# Patient Record
Sex: FEMALE | Race: WHITE | NOT HISPANIC OR LATINO
[De-identification: names, ages, dates, MRNs, and addresses within clinical notes are randomized per-mention and may not be internally consistent; named-entity substitution may affect disease eponyms.]

---
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
## 2019-02-28 ENCOUNTER — RX RENEWAL (OUTPATIENT)
Age: 19
End: 2019-02-28

## 2019-02-28 RX ORDER — DOCUSATE SODIUM 100 MG/1
100 CAPSULE, LIQUID FILLED ORAL TWICE DAILY
Qty: 60 | Refills: 3 | Status: ACTIVE | COMMUNITY
Start: 2018-12-20 | End: 1900-01-01

## 2019-03-29 ENCOUNTER — RX RENEWAL (OUTPATIENT)
Age: 19
End: 2019-03-29

## 2019-12-31 ENCOUNTER — APPOINTMENT (OUTPATIENT)
Dept: PEDIATRIC ADOLESCENT MEDICINE | Facility: CLINIC | Age: 19
End: 2019-12-31
Payer: MEDICAID

## 2019-12-31 VITALS
HEART RATE: 94 BPM | WEIGHT: 142.5 LBS | DIASTOLIC BLOOD PRESSURE: 66 MMHG | SYSTOLIC BLOOD PRESSURE: 105 MMHG | BODY MASS INDEX: 25.57 KG/M2 | HEIGHT: 62.6 IN

## 2019-12-31 DIAGNOSIS — Z00.00 ENCOUNTER FOR GENERAL ADULT MEDICAL EXAMINATION W/OUT ABNORMAL FINDINGS: ICD-10-CM

## 2019-12-31 DIAGNOSIS — Z30.41 ENCOUNTER FOR SURVEILLANCE OF CONTRACEPTIVE PILLS: ICD-10-CM

## 2019-12-31 DIAGNOSIS — Z23 ENCOUNTER FOR IMMUNIZATION: ICD-10-CM

## 2019-12-31 DIAGNOSIS — Z30.45 ENCOUNTER FOR SURVEILLANCE OF TRANSDERMAL PATCH HORMONAL CONTRACEPTIVE DEVICE: ICD-10-CM

## 2019-12-31 PROCEDURE — ZZZZZ: CPT

## 2019-12-31 RX ORDER — NORELGESTROMIN AND ETHINYL ESTRADIOL 150; 35 UG/D; UG/D
150-35 PATCH TRANSDERMAL
Qty: 1 | Refills: 0 | Status: DISCONTINUED | COMMUNITY
Start: 2018-12-20 | End: 2019-12-31

## 2019-12-31 RX ORDER — NORGESTIMATE AND ETHINYL ESTRADIOL 0.25-0.035
0.25-35 KIT ORAL
Qty: 1 | Refills: 1 | Status: ACTIVE | COMMUNITY
Start: 2019-12-31 | End: 1900-01-01

## 2019-12-31 NOTE — REVIEW OF SYSTEMS
[Change in Activity] : no change in activity [Fever] : no fever [Eye Discharge] : no eye discharge [Wgt Loss (___ Lbs)] : no recent weight loss [Swollen Eyelids] : no swollen eyelids [Redness] : no redness [Change in Vision] : no change in vision  [Earache] : no earache [Sore Throat] : no sore throat [Nasal Stuffiness] : no nasal congestion [Edema] : no edema [Cyanosis] : no cyanosis [Nosebleeds] : no epistaxis [Diaphoresis] : not diaphoretic [Exercise Intolerance] : no persistence of exercise intolerance [Chest Pain] : no chest pain or discomfort [Palpitations] : no palpitations [Tachypnea] : not tachypneic [Wheezing] : no wheezing [Shortness of Breath] : no shortness of breath [Cough] : no cough [Vomiting] : no vomiting [Diarrhea] : no diarrhea [Change in Appetite] : no change in appetite [Abdominal Pain] : no abdominal pain [Constipation] : no constipation [Fainting (Syncope)] : no fainting [Seizure] : no seizures [Headache] : no headache [Dizziness] : no dizziness [Limping] : no limping [Back Pain] : ~T no back pain [Joint Swelling] : no joint swelling [Joint Pains] : no arthralgias [Rash] : no rash [Muscle Aches] : no muscle aches [Insect Bites] : no insect bites [Swollen Glands] : no lymphadenopathy [Bruising] : no tendency for easy bruising [Skin Lesions] : no skin lesions [Hyperactive] : no hyperactive behavior [Emotional Problems] : no ~T emotional problems [Sleep Disturbances] : ~T no sleep disturbances [Urinary Frequency] : no change in urinary frequency [Change In Personality] : ~T no personality change [Dec Urine Output] : no oliguria [Pubertal Concerns] : no pubertal concerns [Pain During Urination (Dysuria)] : no dysuria [Vaginal Discharge] : no vaginal discharge [Delayed Menarche] : no delayed menarche [Irregular Periods] : no irregular periods

## 2019-12-31 NOTE — PHYSICAL EXAM
[General Appearance - Well Developed] : interactive [General Appearance - Well-Appearing] : well appearing [Appearance Of Head] : the head was normocephalic [General Appearance - In No Acute Distress] : in no acute distress [Sclera] : the sclera were normal [Examination Of The Oral Cavity] : the teeth, gums, and palate were normal [Both Tympanic Membranes Were Examined] : both tympanic membranes were normal [Nasal Cavity] : the nasal mucosa and septum were normal [Outer Ear] : the ears and nose were normal in appearance [Oropharynx] : the oropharynx was normal  [Neck Cervical Mass (___cm)] : no neck mass was observed [Auscultation Breath Sounds / Voice Sounds] : clear bilateral breath sounds [Respiration, Rhythm And Depth] : normal respiratory rhythm and effort [Murmurs] : no murmurs [Heart Rate And Rhythm] : heart rate and rhythm were normal [Heart Sounds] : normal S1 and S2 [Abdomen Tenderness] : non-tender [Abdomen Soft] : soft [Bowel Sounds] : normal bowel sounds [Abdominal Distention] : nondistended [Motor Tone] : muscle strength and tone were normal [Musculoskeletal Exam: Normal Movement Of All Extremities] : normal movements of all extremities [No Visual Abnormalities] : no visible abnormailities [Deep Tendon Reflexes (DTR)] : deep tendon reflexes were 2+ and symmetric [Generalized Lymph Node Enlargement] : no lymphadenopathy [] : no significant rash [Skin Color & Pigmentation] : normal skin color and pigmentation [Skin Lesions] : no skin lesions [Initial Inspection: Infant Active And Alert] : active and alert [Alert] : alert [No Acute Distress] : no acute distress [Normocephalic] : normocephalic [EOMI Bilateral] : EOMI bilateral [Clear tympanic membranes with bony landmarks and light reflex present bilaterally] : clear tympanic membranes with bony landmarks and light reflex present bilaterally  [Pink Nasal Mucosa] : pink nasal mucosa [No Palpable Masses] : no palpable masses [Supple, full passive range of motion] : supple, full passive range of motion [Nonerythematous Oropharynx] : nonerythematous oropharynx [Clear to Ausculatation Bilaterally] : clear to auscultation bilaterally [Regular Rate and Rhythm] : regular rate and rhythm [Normal S1, S2 audible] : normal S1, S2 audible [No Murmurs] : no murmurs [+2 Femoral Pulses] : +2 femoral pulses [NonTender] : non tender [Soft] : soft [Normoactive Bowel Sounds] : normoactive bowel sounds [Non Distended] : non distended [No Hepatomegaly] : no hepatomegaly [No Splenomegaly] : no splenomegaly [No Abnormal Lymph Nodes Palpated] : no abnormal lymph nodes palpated [No pain or deformities with palpation of bone, muscles, joints] : no pain or deformities with palpation of bone, muscles, joints [No Gait Asymmetry] : no gait asymmetry [Normal Muscle Tone] : normal muscle tone [+2 Patella DTR] : +2 patella DTR [Straight] : straight [No Rash or Lesions] : no rash or lesions [Cranial Nerves Grossly Intact] : cranial nerves grossly intact

## 2019-12-31 NOTE — DISCUSSION/SUMMARY
[Physical Growth and Development] : physical growth and development [Social and Academic Competence] : social and academic competence [Emotional Well-Being] : emotional well-being [Risk Reduction] : risk reduction [Violence and Injury Prevention] : violence and injury prevention [FreeTextEntry1] : Ashley is a 18 year old female here for PPD plant, annual PE and immunization update. Since last PE 2 years ago, Ashley has had elective termination of pregnancy, started on BC patch and has been well. \par POCT urine pregnancy negative today.\par \par Plan:\par - Vaccine today: Hep A#1 today and PPD. 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

## 2019-12-31 NOTE — DEVELOPMENTAL MILESTONES
[Eats meals with family] : eats meals with family [NL] : normal [Eats regular meals including adequate fruits and vegetables] : eats regular meals including adequate fruits and vegetables [Has friends] : has friends [Home is free of violence] : home is free of violence [Uses safety belts/safety equipment] : uses safety belts/safety equipment [Has peer relationships free of violence] : has peer relationships free of violence [Sexually Active] : The patient is sexually active [Sometimes] : sometimes [Has ways to cope with stress] : has ways to cope with stress [Drinks non-sweetened liquids] : drinks no non-sweetened liquids [At least 1 hour of physical acitvity/day] : less than 1 hour of physical activity/day [Uses tobacco/alcohol/drugs] : does not use tobacco/alcohol/drugs [Impaired/Distracted driving] : no impaired/distracted driving [Has problems with sleep] : has no problems with sleep [Gets depressed, anxious, or irritable / has mood swings] : does not get depressed, anxious, or irritable / has no mood swings [Has thoughts about hurting self or considered suicide] : has no thoughts about hurting self or considered suicide [FreeTextEntry5] : lives with godmother (closer to college) [place] [FreeTextEntry6] : [place], sophomore  [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

## 2019-12-31 NOTE — HISTORY OF PRESENT ILLNESS
[Poor Dental Hygiene] : Poor [Needs Immunization] : Needs immunizations [Diverse, Healthy Diet] : her current diet is diverse and healthy [None] : The patient does not use dietary supplements [Acute Illness] : no illness since last visit [Adverse Reaction] : the patient has not had any significant adverse reactions to immunizations [de-identified] : has an appointment for impacted wisdom teeth extraction [FreeTextEntry1] : Ashley is a 18 year old female here for PPD plant, annual PE and immunization update. \par \par Since last PE, 2 years ago, ED visit for fever and infection after elective  however no hospitalizations. \par \par Currently on BC patch followed by GYN, Dr. Russo. Tested for STI at that time. Declined for testing today\par LMP:  2 weeks ago, lasting 4 days, still have cramps relieved with ibuprofen\par Denies history of migraines with auras\par \par Total lifetime partners:  1\par Current partners: 0\par Last SA:  last year\par Denies STI history\par Denies any painful urination, vaginal discharge/odor or lesions/mass\par Denies family hx of blood clots, stroke or migraines with auras

## 2019-12-31 NOTE — REVIEW OF SYSTEMS
[Change in Activity] : no change in activity [Fever] : no fever [Eye Discharge] : no eye discharge [Wgt Loss (___ Lbs)] : no recent weight loss [Swollen Eyelids] : no swollen eyelids [Nasal Stuffiness] : no nasal congestion [Redness] : no redness [Change in Vision] : no change in vision  [Sore Throat] : no sore throat [Nosebleeds] : no epistaxis [Earache] : no earache [Diaphoresis] : not diaphoretic [Edema] : no edema [Cyanosis] : no cyanosis [Palpitations] : no palpitations [Exercise Intolerance] : no persistence of exercise intolerance [Chest Pain] : no chest pain or discomfort [Cough] : no cough [Tachypnea] : not tachypneic [Wheezing] : no wheezing [Shortness of Breath] : no shortness of breath [Change in Appetite] : no change in appetite [Vomiting] : no vomiting [Constipation] : no constipation [Diarrhea] : no diarrhea [Abdominal Pain] : no abdominal pain [Seizure] : no seizures [Headache] : no headache [Fainting (Syncope)] : no fainting [Joint Pains] : no arthralgias [Dizziness] : no dizziness [Limping] : no limping [Muscle Aches] : no muscle aches [Joint Swelling] : no joint swelling [Back Pain] : ~T no back pain [Rash] : no rash [Skin Lesions] : no skin lesions [Insect Bites] : no insect bites [Swollen Glands] : no lymphadenopathy [Sleep Disturbances] : ~T no sleep disturbances [Bruising] : no tendency for easy bruising [Hyperactive] : no hyperactive behavior [Emotional Problems] : no ~T emotional problems [Dec Urine Output] : no oliguria [Change In Personality] : ~T no personality change [Vaginal Discharge] : no vaginal discharge [Urinary Frequency] : no change in urinary frequency [Pain During Urination (Dysuria)] : no dysuria [Irregular Periods] : no irregular periods [Pubertal Concerns] : no pubertal concerns [Delayed Menarche] : no delayed menarche [Negative] : Genitourinary

## 2019-12-31 NOTE — DEVELOPMENTAL MILESTONES
[Eats meals with family] : eats meals with family [Has friends] : has friends [NL] : normal [Eats regular meals including adequate fruits and vegetables] : eats regular meals including adequate fruits and vegetables [Home is free of violence] : home is free of violence [Uses safety belts/safety equipment] : uses safety belts/safety equipment [Sometimes] : sometimes [Sexually Active] : The patient is sexually active [Has peer relationships free of violence] : has peer relationships free of violence [Has ways to cope with stress] : has ways to cope with stress [Drinks non-sweetened liquids] : drinks no non-sweetened liquids [At least 1 hour of physical acitvity/day] : less than 1 hour of physical activity/day [Uses tobacco/alcohol/drugs] : does not use tobacco/alcohol/drugs [Has problems with sleep] : has no problems with sleep [Impaired/Distracted driving] : no impaired/distracted driving [Has thoughts about hurting self or considered suicide] : has no thoughts about hurting self or considered suicide [Gets depressed, anxious, or irritable / has mood swings] : does not get depressed, anxious, or irritable / has no mood swings [FreeTextEntry6] : [place], sophomore  [FreeTextEntry5] : lives with godmother (closer to college) parents [place] [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

## 2019-12-31 NOTE — HISTORY OF PRESENT ILLNESS
[Poor Dental Hygiene] : Poor [Needs Immunization] : Needs immunizations [Diverse, Healthy Diet] : her current diet is diverse and healthy [None] : The patient does not use dietary supplements [Mother] : mother [Eats meals with family] : eats meals with family [Has friends] : has friends [Yes] : Patient has had sexual intercourse. [With Teen] : teen [Acute Illness] : no illness since last visit [Adverse Reaction] : the patient has not had any significant adverse reactions to immunizations [de-identified] : has an appointment for impacted wisdom teeth extraction [Uses safety belts/safety equipment] : uses safety belts/safety equipment  [Has peer relationships free of violence] : has peer relationships free of violence [Eats regular meals including adequate fruits and vegetables] : does not eat regular meals including adequate fruits and vegetables [Drinks non-sweetened liquids] : does not drink non-sweetened liquids  [Has interests/participates in community activities/volunteers] : does not have interests/participates in community activities/volunteers [At least 1 hour of physical activity a day] : does not do at least 1 hour of physical activity a day [de-identified] : PT works as  at \[employer]\"" [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 \par \par Since last PE, 1 years ago, doing well. Denies ED visits or hospitalization. \par \par Mother aware of sexual activity since HS. Patient has been off the patch for a few months and menses were heavier, with cramps and mood swings. Currently back on patch, patient restart patch 2 weeks ago but wants to try OCP due to SE with patch: rash on site. \par Menarche: 12 years old \par LMP:  2 weeks ago, lasting 4 days\par Tried OCP in HS with no complaints \par Denies history of migraines with auras\par Has not seen by GYN, Dr. Russo for a few years \par Total lifetime partners:  2\par Current partners: 1 male, consistent condom use\par Last SA:  2 months ago \par Denies STI history, painful urination, vaginal discharge/odor or lesions/mass\par

## 2020-01-02 LAB
BASOPHILS # BLD AUTO: 0.04 K/UL
BASOPHILS NFR BLD AUTO: 0.5 %
C TRACH RRNA SPEC QL NAA+PROBE: NOT DETECTED
CHOLEST SERPL-MCNC: 209 MG/DL
CHOLEST/HDLC SERPL: 3.3 RATIO
EOSINOPHIL # BLD AUTO: 0.08 K/UL
EOSINOPHIL NFR BLD AUTO: 1 %
ESTIMATED AVERAGE GLUCOSE: 114 MG/DL
HBA1C MFR BLD HPLC: 5.6 %
HCT VFR BLD CALC: 41 %
HDLC SERPL-MCNC: 63 MG/DL
HGB BLD-MCNC: 12.9 G/DL
HIV1+2 AB SPEC QL IA.RAPID: NONREACTIVE
IMM GRANULOCYTES NFR BLD AUTO: 0.4 %
LDLC SERPL CALC-MCNC: 126 MG/DL
LYMPHOCYTES # BLD AUTO: 1.89 K/UL
LYMPHOCYTES NFR BLD AUTO: 24.7 %
MAN DIFF?: NORMAL
MCHC RBC-ENTMCNC: 27.3 PG
MCHC RBC-ENTMCNC: 31.5 GM/DL
MCV RBC AUTO: 86.7 FL
MONOCYTES # BLD AUTO: 0.61 K/UL
MONOCYTES NFR BLD AUTO: 8 %
N GONORRHOEA RRNA SPEC QL NAA+PROBE: NOT DETECTED
NEUTROPHILS # BLD AUTO: 5.01 K/UL
NEUTROPHILS NFR BLD AUTO: 65.4 %
PLATELET # BLD AUTO: 325 K/UL
RBC # BLD: 4.73 M/UL
RBC # FLD: 14.4 %
SOURCE AMPLIFICATION: NORMAL
T PALLIDUM AB SER QL IA: NEGATIVE
TRIGL SERPL-MCNC: 100 MG/DL
WBC # FLD AUTO: 7.66 K/UL

## 2020-02-07 ENCOUNTER — EMERGENCY (EMERGENCY)
Facility: HOSPITAL | Age: 20
LOS: 1 days | Discharge: HOME | End: 2020-02-07
Admitting: EMERGENCY MEDICINE
Payer: MEDICAID

## 2020-02-07 VITALS
SYSTOLIC BLOOD PRESSURE: 113 MMHG | OXYGEN SATURATION: 100 % | HEART RATE: 92 BPM | TEMPERATURE: 98 F | DIASTOLIC BLOOD PRESSURE: 78 MMHG | RESPIRATION RATE: 16 BRPM

## 2020-02-07 PROCEDURE — 73610 X-RAY EXAM OF ANKLE: CPT | Mod: 26,RT

## 2020-02-07 PROCEDURE — 99283 EMERGENCY DEPT VISIT LOW MDM: CPT

## 2020-02-07 PROCEDURE — 73630 X-RAY EXAM OF FOOT: CPT | Mod: 26,RT

## 2020-02-07 RX ORDER — ACETAMINOPHEN 500 MG
975 TABLET ORAL ONCE
Refills: 0 | Status: COMPLETED | OUTPATIENT
Start: 2020-02-07 | End: 2020-02-07

## 2020-02-07 RX ADMIN — Medication 975 MILLIGRAM(S): at 21:48

## 2020-02-07 NOTE — ED PROVIDER NOTE - PHYSICAL EXAMINATION
Gen: Well appearing in NAD  Head: NC/AT  Neck: trachea midline  Resp:  No distress  Ext: right foot: no swelling or obv def. decreased rom without pain. no bruising or ecchymosis. mild tenderness dorsum of foot. sensations intact. dp pulse intact.   Neuro:  A&O appears non focal  Skin:  Warm and dry as visualized  Psych:  Normal affect and mood

## 2020-02-07 NOTE — ED PROVIDER NOTE - PATIENT PORTAL LINK FT
You can access the FollowMyHealth Patient Portal offered by [facility] by registering at the following website: [email or web address]. By joining [facility]’s FollowMyHealth portal, you will also be able to view your health information using other applications (apps) compatible with our system.

## 2020-02-07 NOTE — ED PROVIDER NOTE - CLINICAL SUMMARY MEDICAL DECISION MAKING FREE TEXT BOX
19 y/o female c/o right foot/ankle injury s/p slip and fall  -probable sprain, low concern for fx  -xrays, pain control  -reassess

## 2020-02-07 NOTE — ED PROVIDER NOTE - OBJECTIVE STATEMENT
21y/o female no PMH presents to ER c/o right foot injury. Pt states at 6pm she was dancing in her room and twisted her right foot feeling increased pain to area and was unable to bear weight - staets tried otc pain meds with minimal reliedand c/o increased pain. denies numbness tingling bleeding bruising or any other injury.

## 2020-03-09 ENCOUNTER — APPOINTMENT (OUTPATIENT)
Dept: PEDIATRIC ADOLESCENT MEDICINE | Facility: HOSPITAL | Age: 20
End: 2020-03-09

## 2020-03-12 ENCOUNTER — NON-APPOINTMENT (OUTPATIENT)
Age: 20
End: 2020-03-12

## 2020-03-12 ENCOUNTER — APPOINTMENT (OUTPATIENT)
Dept: OPHTHALMOLOGY | Facility: CLINIC | Age: 20
End: 2020-03-12
Payer: MEDICAID

## 2020-03-12 PROCEDURE — 92015 DETERMINE REFRACTIVE STATE: CPT

## 2020-03-12 PROCEDURE — 92004 COMPRE OPH EXAM NEW PT 1/>: CPT

## 2020-06-20 ENCOUNTER — EMERGENCY (EMERGENCY)
Facility: HOSPITAL | Age: 20
LOS: 1 days | Discharge: HOME | End: 2020-06-20
Attending: EMERGENCY MEDICINE | Admitting: EMERGENCY MEDICINE
Payer: MEDICAID

## 2020-06-20 VITALS
SYSTOLIC BLOOD PRESSURE: 118 MMHG | TEMPERATURE: 98 F | DIASTOLIC BLOOD PRESSURE: 82 MMHG | OXYGEN SATURATION: 100 % | HEART RATE: 87 BPM | RESPIRATION RATE: 16 BRPM

## 2020-06-20 DIAGNOSIS — O03.9 COMPLETE OR UNSPECIFIED SPONTANEOUS ABORTION WITHOUT COMPLICATION: Chronic | ICD-10-CM

## 2020-06-20 LAB
ALBUMIN SERPL ELPH-MCNC: 4.4 G/DL — SIGNIFICANT CHANGE UP (ref 3.3–5)
ALP SERPL-CCNC: 69 U/L — SIGNIFICANT CHANGE UP (ref 40–120)
ALT FLD-CCNC: 9 U/L — SIGNIFICANT CHANGE UP (ref 4–33)
ANION GAP SERPL CALC-SCNC: 13 MMO/L — SIGNIFICANT CHANGE UP (ref 7–14)
APPEARANCE UR: SIGNIFICANT CHANGE UP
AST SERPL-CCNC: 10 U/L — SIGNIFICANT CHANGE UP (ref 4–32)
BACTERIA # UR AUTO: SIGNIFICANT CHANGE UP
BASE EXCESS BLDV CALC-SCNC: 1.5 MMOL/L — SIGNIFICANT CHANGE UP
BASOPHILS # BLD AUTO: 0.04 K/UL — SIGNIFICANT CHANGE UP (ref 0–0.2)
BASOPHILS NFR BLD AUTO: 0.6 % — SIGNIFICANT CHANGE UP (ref 0–2)
BILIRUB SERPL-MCNC: 0.2 MG/DL — SIGNIFICANT CHANGE UP (ref 0.2–1.2)
BILIRUB UR-MCNC: NEGATIVE — SIGNIFICANT CHANGE UP
BLOOD GAS VENOUS - CREATININE: 0.56 MG/DL — SIGNIFICANT CHANGE UP (ref 0.5–1.3)
BLOOD GAS VENOUS - FIO2: 21 — SIGNIFICANT CHANGE UP
BLOOD UR QL VISUAL: NEGATIVE — SIGNIFICANT CHANGE UP
BUN SERPL-MCNC: 7 MG/DL — SIGNIFICANT CHANGE UP (ref 7–23)
CALCIUM SERPL-MCNC: 10 MG/DL — SIGNIFICANT CHANGE UP (ref 8.4–10.5)
CHLORIDE BLDV-SCNC: 109 MMOL/L — HIGH (ref 96–108)
CHLORIDE SERPL-SCNC: 103 MMOL/L — SIGNIFICANT CHANGE UP (ref 98–107)
CO2 SERPL-SCNC: 24 MMOL/L — SIGNIFICANT CHANGE UP (ref 22–31)
COLOR SPEC: SIGNIFICANT CHANGE UP
CREAT SERPL-MCNC: 0.63 MG/DL — SIGNIFICANT CHANGE UP (ref 0.5–1.3)
EOSINOPHIL # BLD AUTO: 0.09 K/UL — SIGNIFICANT CHANGE UP (ref 0–0.5)
EOSINOPHIL NFR BLD AUTO: 1.3 % — SIGNIFICANT CHANGE UP (ref 0–6)
GAS PNL BLDV: 141 MMOL/L — SIGNIFICANT CHANGE UP (ref 136–146)
GLUCOSE BLDV-MCNC: 93 MG/DL — SIGNIFICANT CHANGE UP (ref 70–99)
GLUCOSE SERPL-MCNC: 100 MG/DL — HIGH (ref 70–99)
GLUCOSE UR-MCNC: NEGATIVE — SIGNIFICANT CHANGE UP
HCO3 BLDV-SCNC: 25 MMOL/L — SIGNIFICANT CHANGE UP (ref 20–27)
HCT VFR BLD CALC: 39.3 % — SIGNIFICANT CHANGE UP (ref 34.5–45)
HCT VFR BLDV CALC: 40.3 % — SIGNIFICANT CHANGE UP (ref 34.5–45)
HGB BLD-MCNC: 13.1 G/DL — SIGNIFICANT CHANGE UP (ref 11.5–15.5)
HGB BLDV-MCNC: 13.1 G/DL — SIGNIFICANT CHANGE UP (ref 11.5–15.5)
HYALINE CASTS # UR AUTO: NEGATIVE — SIGNIFICANT CHANGE UP
IMM GRANULOCYTES NFR BLD AUTO: 0.1 % — SIGNIFICANT CHANGE UP (ref 0–1.5)
KETONES UR-MCNC: NEGATIVE — SIGNIFICANT CHANGE UP
LACTATE BLDV-MCNC: 1.2 MMOL/L — SIGNIFICANT CHANGE UP (ref 0.5–2)
LEUKOCYTE ESTERASE UR-ACNC: NEGATIVE — SIGNIFICANT CHANGE UP
LIDOCAIN IGE QN: 34.1 U/L — SIGNIFICANT CHANGE UP (ref 7–60)
LYMPHOCYTES # BLD AUTO: 1.89 K/UL — SIGNIFICANT CHANGE UP (ref 1–3.3)
LYMPHOCYTES # BLD AUTO: 27.1 % — SIGNIFICANT CHANGE UP (ref 13–44)
MCHC RBC-ENTMCNC: 28.4 PG — SIGNIFICANT CHANGE UP (ref 27–34)
MCHC RBC-ENTMCNC: 33.3 % — SIGNIFICANT CHANGE UP (ref 32–36)
MCV RBC AUTO: 85.2 FL — SIGNIFICANT CHANGE UP (ref 80–100)
MONOCYTES # BLD AUTO: 0.73 K/UL — SIGNIFICANT CHANGE UP (ref 0–0.9)
MONOCYTES NFR BLD AUTO: 10.5 % — SIGNIFICANT CHANGE UP (ref 2–14)
NEUTROPHILS # BLD AUTO: 4.22 K/UL — SIGNIFICANT CHANGE UP (ref 1.8–7.4)
NEUTROPHILS NFR BLD AUTO: 60.4 % — SIGNIFICANT CHANGE UP (ref 43–77)
NITRITE UR-MCNC: NEGATIVE — SIGNIFICANT CHANGE UP
NRBC # FLD: 0 K/UL — SIGNIFICANT CHANGE UP (ref 0–0)
PCO2 BLDV: 44 MMHG — SIGNIFICANT CHANGE UP (ref 41–51)
PH BLDV: 7.39 PH — SIGNIFICANT CHANGE UP (ref 7.32–7.43)
PH UR: 8.5 — HIGH (ref 5–8)
PLATELET # BLD AUTO: 321 K/UL — SIGNIFICANT CHANGE UP (ref 150–400)
PMV BLD: 10.6 FL — SIGNIFICANT CHANGE UP (ref 7–13)
PO2 BLDV: 42 MMHG — HIGH (ref 35–40)
POTASSIUM BLDV-SCNC: 3.5 MMOL/L — SIGNIFICANT CHANGE UP (ref 3.4–4.5)
POTASSIUM SERPL-MCNC: 3.8 MMOL/L — SIGNIFICANT CHANGE UP (ref 3.5–5.3)
POTASSIUM SERPL-SCNC: 3.8 MMOL/L — SIGNIFICANT CHANGE UP (ref 3.5–5.3)
PROT SERPL-MCNC: 7.3 G/DL — SIGNIFICANT CHANGE UP (ref 6–8.3)
PROT UR-MCNC: NEGATIVE — SIGNIFICANT CHANGE UP
RBC # BLD: 4.61 M/UL — SIGNIFICANT CHANGE UP (ref 3.8–5.2)
RBC # FLD: 13.1 % — SIGNIFICANT CHANGE UP (ref 10.3–14.5)
RBC CASTS # UR COMP ASSIST: SIGNIFICANT CHANGE UP (ref 0–?)
SAO2 % BLDV: 75.3 % — SIGNIFICANT CHANGE UP (ref 60–85)
SODIUM SERPL-SCNC: 140 MMOL/L — SIGNIFICANT CHANGE UP (ref 135–145)
SP GR SPEC: 1.01 — SIGNIFICANT CHANGE UP (ref 1–1.04)
SQUAMOUS # UR AUTO: SIGNIFICANT CHANGE UP
UROBILINOGEN FLD QL: NORMAL — SIGNIFICANT CHANGE UP
WBC # BLD: 6.98 K/UL — SIGNIFICANT CHANGE UP (ref 3.8–10.5)
WBC # FLD AUTO: 6.98 K/UL — SIGNIFICANT CHANGE UP (ref 3.8–10.5)
WBC UR QL: SIGNIFICANT CHANGE UP (ref 0–?)

## 2020-06-20 PROCEDURE — 99284 EMERGENCY DEPT VISIT MOD MDM: CPT

## 2020-06-20 RX ORDER — SODIUM CHLORIDE 9 MG/ML
1000 INJECTION INTRAMUSCULAR; INTRAVENOUS; SUBCUTANEOUS ONCE
Refills: 0 | Status: COMPLETED | OUTPATIENT
Start: 2020-06-20 | End: 2020-06-20

## 2020-06-20 RX ORDER — ACETAMINOPHEN 500 MG
1000 TABLET ORAL ONCE
Refills: 0 | Status: COMPLETED | OUTPATIENT
Start: 2020-06-20 | End: 2020-06-20

## 2020-06-20 RX ORDER — METOCLOPRAMIDE HCL 10 MG
10 TABLET ORAL ONCE
Refills: 0 | Status: COMPLETED | OUTPATIENT
Start: 2020-06-20 | End: 2020-06-20

## 2020-06-20 RX ORDER — ACETAMINOPHEN 500 MG
975 TABLET ORAL ONCE
Refills: 0 | Status: DISCONTINUED | OUTPATIENT
Start: 2020-06-20 | End: 2020-06-20

## 2020-06-20 RX ADMIN — Medication 10 MILLIGRAM(S): at 15:39

## 2020-06-20 RX ADMIN — SODIUM CHLORIDE 1000 MILLILITER(S): 9 INJECTION INTRAMUSCULAR; INTRAVENOUS; SUBCUTANEOUS at 15:39

## 2020-06-20 RX ADMIN — Medication 400 MILLIGRAM(S): at 15:38

## 2020-06-20 NOTE — ED ADULT NURSE REASSESSMENT NOTE - NS ED NURSE REASSESS COMMENT FT1
Pt began to yell for "Help" when approached by team pt states "I'm fine, I just want to go home. I don't want this IV anymore, I feel better the headache and belly pain is gone I want to go home." Continues to be A&Ox4. Dr. Kaminski and ELIZABETH Ho spoke to patient and pt continues to state she wants to leave AMA. Pt states "I will go home by [employer] and  my car later." IV removed, pt does not appear to be restless at this time, denies feelings of restlessness and anxiety. Pt is able to sit and wait for MD comfortably to sign AMA papers. Pt is also able to ambulate with steady gait at this time.

## 2020-06-20 NOTE — ED PROVIDER NOTE - NSFOLLOWUPINSTRUCTIONS_ED_ALL_ED_FT
YOU SIGNED OUT AGAINST MEDICAL ADVICE  FOLLOW UP WITH YOUR PRIMARY CARE DOCTOR    YOU MAY RETURN TO ER AT ANYTIME    Worsening, continued or new concerning symptoms return to the emergency department.     -- Please avoid contact with others while you are symptomatic and ideally two weeks.  -- This is likely a virus.  There is no direct treatment for a virus, and antibiotics are not effective.     -- Rest, increase your fluid intake and avoid dehydration.  -- Take Tylenol for fever of 100.4 or greater   -- It is very important to be diligent about hand washing & hygiene to avoid spreading the illness to others.  -- If you are having any worsening shortness of breath, please see your doctor or return to the Emergency Department.  -- Please continue taking your home medications as directed.     What should I do now?    If you are well enough to be discharged home and are not in a high risk group to have contracted the COVID-19, you should care for yourself at home exactly like you would if you have Influenza “flu”. Follow all the standard guidelines about washing your hands, covering your cough, etc. You should return to the Emergency Department if you develop worse symptoms, trouble breathing, chest pain, and/or a fever that doesn’t improve with over the counter acetaminophen.

## 2020-06-20 NOTE — ED PROVIDER NOTE - OBJECTIVE STATEMENT
19 y/o female with no pmhx presents to ED c/o rhinorrhea, headache, body aches, nausea, abd cramping, diarrhea x 1 week. States started with watery diarrhea x 1 week, about 2-3 episodes daily, associated with abd lower cramping. Body aches and headache x 2-3 days. Gradual onset headache, minimal relief w/ tylenol. Went back to work as a  2 weeks ago. Hx of surgical  years ago. Sexually active with one male partner, no new partners. +chance of pregnancy does not use contraception. No known covid contacts. No fever, cp, sob, cough, vomiting, dysuria, syncope.

## 2020-06-20 NOTE — ED ADULT NURSE NOTE - CHIEF COMPLAINT QUOTE
pt c/o headache weakness and nausea   states does not like to take pills  pt took advil yesterday and it didn't work / states needs note for work

## 2020-06-20 NOTE — ED ADULT NURSE NOTE - NSIMPLEMENTINTERV_GEN_ALL_ED
Implemented All Universal Safety Interventions:  [place] to call system. Call bell, personal items and telephone within reach. Instruct patient to call for assistance. Room bathroom lighting operational. Non-slip footwear when patient is off stretcher. Physically safe environment: no spills, clutter or unnecessary equipment. Stretcher in lowest position, wheels locked, appropriate side rails in place.

## 2020-06-20 NOTE — ED PROVIDER NOTE - ATTENDING CONTRIBUTION TO CARE
20F p/w HA, body ache, runny nose, diarrhea, lower abd cramping.  diarrhea 1 week ago other sx x3 days.  Pt concerned if she can go back to work.  Pt with some abd cramping.  FULLER made her come in.  On exam pelvic unremarkable.  Very tender diffuse abd.  Check COVID swab.  Plan rx fluids, check labs and urine.  Diffuse abd ttp, local RLQ ttp as well would check CT a/p r/o appx.    VS:  unremarkable    GEN - mild distress HA, malaise;   A+O x3   HEAD - NC/AT     ENT - PEERL, EOMI, mucous membranes   dry , no discharge      NECK: Neck supple, non-tender without lymphadenopathy, no masses, no JVD  PULM - CTA b/l,  symmetric breath sounds  COR -  normal heart sounds    ABD - , ND, RLQ ttp, soft,  BACK - no CVA tenderness, nontender spine     EXTREMS - no edema, no deformity, warm and well perfused    SKIN - no rash    or bruising      NEUROLOGIC - alert, face symmetric, speech fluent, sensation nl, motor no focal deficit.

## 2020-06-20 NOTE — ED ADULT TRIAGE NOTE - CHIEF COMPLAINT QUOTE
pt c/o headache weakness and nausea   states needs note for work pt c/o headache weakness and nausea   states does not like to take pills  pt took advil yesterday and it didn't work / states needs note for work

## 2020-06-20 NOTE — ED PROVIDER NOTE - PROGRESS NOTE DETAILS
Pt requesting to leave ER and have IV taken out. Risks of leaving have been discussed  such as misdiagnosis, appendicitis, worsening illness leading up to and including prolonged or permanent disability or death. Pt refusing further evaluation, treatment, or admission at this time. They appear clinically sober, AxOx3, free from distracting injury, appear to have intact insight, judgement, and reason and in my opinion have the capacity to make this decision. The patient was able to verbally state back in a coherent manner their current medical condition/current diagnosis, the proposes course of treatment, and the risks, benefits, and alternatives of treatment versus leaving against medical advice. They understand that they may return to seek medical attention here at whatever time they want. I highly advised them to return to the ED immediately if they experienced any new or worsening symptoms, reconsider treatment or had any other concerns. This would be without any repercussions. I recommended they follow-up with GI and primary care within 24 hours for further evaluation and treatment. juan josé TORRES. Pt requesting to leave ER and have IV taken out. Risks of leaving have been discussed  such as misdiagnosis, appendicitis, worsening illness leading up to and including prolonged or permanent disability or death. Pt refusing further evaluation, treatment, or admission at this time. They appear clinically sober, AxOx3, free from distracting injury, appear to have intact insight, judgement, and reason and in my opinion have the capacity to make this decision. The patient was able to verbally state back in a coherent manner their current medical condition/current diagnosis, the proposes course of treatment, and the risks, benefits, and alternatives of treatment versus leaving against medical advice. They understand that they may return to seek medical attention here at whatever time they want. I highly advised them to return to the ED immediately if they experienced any new or worsening symptoms, reconsider treatment or had any other concerns. This would be without any repercussions. I recommended they follow-up with GI and primary care within 24 hours for further evaluation and treatment. pt signed AMA form. states will be taking uber home, aware of possible reaction of reglan.

## 2020-06-20 NOTE — ED ADULT NURSE NOTE - OBJECTIVE STATEMENT
Pt presents to intake, A&Ox4, ambulatory w/o assistance, here for evaluation of RLQ pain and headache w/ nausea x1wk, worse last 2 days. Pt appears comfortable at this time. Denies blurry vision, sensitivity to light, chest pain, shortness of breath, palpitations, diaphoresis, fevers, dizziness, vomiting, diarrhea, or urinary symptoms at this time. Pt appears anxious for IV placement, plan of care explained, verbalizes understanding. IV established in right arm with a 20G, labs drawn and sent, call bell in reach, warm blanket provided, bed in lowest position, side rails up x2, MD evaluation in progress. Will continue to monitor.

## 2020-06-20 NOTE — ED PROVIDER NOTE - CLINICAL SUMMARY MEDICAL DECISION MAKING FREE TEXT BOX
21 y/o female with no pmhx presents to ED c/o rhinorrhea, headache, body aches, nausea, abd cramping, diarrhea x 1 week. States started with watery diarrhea x 1 week, about 2-3 episodes daily, associated with abd lower cramping. Body aches and headache x 2-3 days. Gradual onset headache, minimal relief w/ Tylenol. Went back to work as a  2 weeks ago. Pt well appearing, +RLQ abd tenderness on exam, plan to check ct r/o appendicitis. discussed likely viral syndrome vs covid- 2 weeks quarantine at home, supportive care, will send covid swab, and pcp follow up

## 2020-06-20 NOTE — ED PROVIDER NOTE - PATIENT PORTAL LINK FT
You can access the FollowMyHealth Patient Portal offered by [facility] by registering at the following website: [email or web address]. By joining [facility]’s FollowMyHealth portal, you will also be able to view your health information using other applications (apps) compatible with our system.

## 2020-06-21 LAB — SARS-COV-2 RNA SPEC QL NAA+PROBE: SIGNIFICANT CHANGE UP

## 2020-06-22 ENCOUNTER — TRANSCRIPTION ENCOUNTER (OUTPATIENT)
Age: 20
End: 2020-06-22

## 2020-06-23 LAB
C TRACH RRNA SPEC QL NAA+PROBE: SIGNIFICANT CHANGE UP
N GONORRHOEA RRNA SPEC QL NAA+PROBE: SIGNIFICANT CHANGE UP
SPECIMEN SOURCE: SIGNIFICANT CHANGE UP

## 2020-09-20 ENCOUNTER — EMERGENCY (EMERGENCY)
Facility: HOSPITAL | Age: 20
LOS: 1 days | Discharge: HOME | End: 2020-09-20
Attending: EMERGENCY MEDICINE | Admitting: EMERGENCY MEDICINE
Payer: MEDICAID

## 2020-09-20 VITALS
SYSTOLIC BLOOD PRESSURE: 101 MMHG | OXYGEN SATURATION: 100 % | TEMPERATURE: 98 F | RESPIRATION RATE: 16 BRPM | HEART RATE: 116 BPM | DIASTOLIC BLOOD PRESSURE: 60 MMHG

## 2020-09-20 DIAGNOSIS — O03.9 COMPLETE OR UNSPECIFIED SPONTANEOUS ABORTION WITHOUT COMPLICATION: Chronic | ICD-10-CM

## 2020-09-20 LAB
ALBUMIN SERPL ELPH-MCNC: 4.4 G/DL — SIGNIFICANT CHANGE UP (ref 3.3–5)
ALP SERPL-CCNC: 60 U/L — SIGNIFICANT CHANGE UP (ref 40–120)
ALT FLD-CCNC: 11 U/L — SIGNIFICANT CHANGE UP (ref 4–33)
ANION GAP SERPL CALC-SCNC: 15 MMO/L — HIGH (ref 7–14)
APPEARANCE UR: SIGNIFICANT CHANGE UP
AST SERPL-CCNC: 12 U/L — SIGNIFICANT CHANGE UP (ref 4–32)
BACTERIA # UR AUTO: HIGH
BILIRUB SERPL-MCNC: 0.3 MG/DL — SIGNIFICANT CHANGE UP (ref 0.2–1.2)
BILIRUB UR-MCNC: NEGATIVE — SIGNIFICANT CHANGE UP
BLOOD UR QL VISUAL: NEGATIVE — SIGNIFICANT CHANGE UP
BUN SERPL-MCNC: 6 MG/DL — LOW (ref 7–23)
CALCIUM SERPL-MCNC: 9.2 MG/DL — SIGNIFICANT CHANGE UP (ref 8.4–10.5)
CHLORIDE SERPL-SCNC: 98 MMOL/L — SIGNIFICANT CHANGE UP (ref 98–107)
CO2 SERPL-SCNC: 25 MMOL/L — SIGNIFICANT CHANGE UP (ref 22–31)
COLOR SPEC: YELLOW — SIGNIFICANT CHANGE UP
CREAT SERPL-MCNC: 0.72 MG/DL — SIGNIFICANT CHANGE UP (ref 0.5–1.3)
GLUCOSE SERPL-MCNC: 107 MG/DL — HIGH (ref 70–99)
GLUCOSE UR-MCNC: NEGATIVE — SIGNIFICANT CHANGE UP
HCG UR-SCNC: NEGATIVE — SIGNIFICANT CHANGE UP
HCT VFR BLD CALC: 40.5 % — SIGNIFICANT CHANGE UP (ref 34.5–45)
HGB BLD-MCNC: 12.7 G/DL — SIGNIFICANT CHANGE UP (ref 11.5–15.5)
HYALINE CASTS # UR AUTO: NEGATIVE — SIGNIFICANT CHANGE UP
KETONES UR-MCNC: NEGATIVE — SIGNIFICANT CHANGE UP
LEUKOCYTE ESTERASE UR-ACNC: NEGATIVE — SIGNIFICANT CHANGE UP
MCHC RBC-ENTMCNC: 27 PG — SIGNIFICANT CHANGE UP (ref 27–34)
MCHC RBC-ENTMCNC: 31.4 % — LOW (ref 32–36)
MCV RBC AUTO: 86 FL — SIGNIFICANT CHANGE UP (ref 80–100)
NITRITE UR-MCNC: NEGATIVE — SIGNIFICANT CHANGE UP
NRBC # FLD: 0 K/UL — SIGNIFICANT CHANGE UP (ref 0–0)
PH UR: 6.5 — SIGNIFICANT CHANGE UP (ref 5–8)
PLATELET # BLD AUTO: 249 K/UL — SIGNIFICANT CHANGE UP (ref 150–400)
PMV BLD: 10.4 FL — SIGNIFICANT CHANGE UP (ref 7–13)
POTASSIUM SERPL-MCNC: 3.5 MMOL/L — SIGNIFICANT CHANGE UP (ref 3.5–5.3)
POTASSIUM SERPL-SCNC: 3.5 MMOL/L — SIGNIFICANT CHANGE UP (ref 3.5–5.3)
PROT SERPL-MCNC: 7.6 G/DL — SIGNIFICANT CHANGE UP (ref 6–8.3)
PROT UR-MCNC: 20 — SIGNIFICANT CHANGE UP
RBC # BLD: 4.71 M/UL — SIGNIFICANT CHANGE UP (ref 3.8–5.2)
RBC # FLD: 13.2 % — SIGNIFICANT CHANGE UP (ref 10.3–14.5)
RBC CASTS # UR COMP ASSIST: SIGNIFICANT CHANGE UP (ref 0–?)
SARS-COV-2 RNA SPEC QL NAA+PROBE: SIGNIFICANT CHANGE UP
SODIUM SERPL-SCNC: 138 MMOL/L — SIGNIFICANT CHANGE UP (ref 135–145)
SP GR SPEC: 1.01 — SIGNIFICANT CHANGE UP (ref 1–1.04)
SP GR UR: 1.01 — SIGNIFICANT CHANGE UP (ref 1–1.04)
SQUAMOUS # UR AUTO: SIGNIFICANT CHANGE UP
UROBILINOGEN FLD QL: SIGNIFICANT CHANGE UP
WBC # BLD: 8.9 K/UL — SIGNIFICANT CHANGE UP (ref 3.8–10.5)
WBC # FLD AUTO: 8.9 K/UL — SIGNIFICANT CHANGE UP (ref 3.8–10.5)
WBC UR QL: HIGH (ref 0–?)

## 2020-09-20 PROCEDURE — 99284 EMERGENCY DEPT VISIT MOD MDM: CPT

## 2020-09-20 PROCEDURE — 71045 X-RAY EXAM CHEST 1 VIEW: CPT | Mod: 26

## 2020-09-20 RX ORDER — CEPHALEXIN 500 MG
1 CAPSULE ORAL
Qty: 15 | Refills: 0
Start: 2020-09-20 | End: 2020-09-24

## 2020-09-20 RX ORDER — CEFTRIAXONE 500 MG/1
1000 INJECTION, POWDER, FOR SOLUTION INTRAMUSCULAR; INTRAVENOUS ONCE
Refills: 0 | Status: COMPLETED | OUTPATIENT
Start: 2020-09-20 | End: 2020-09-20

## 2020-09-20 RX ORDER — SODIUM CHLORIDE 9 MG/ML
1000 INJECTION INTRAMUSCULAR; INTRAVENOUS; SUBCUTANEOUS ONCE
Refills: 0 | Status: COMPLETED | OUTPATIENT
Start: 2020-09-20 | End: 2020-09-20

## 2020-09-20 RX ADMIN — SODIUM CHLORIDE 1000 MILLILITER(S): 9 INJECTION INTRAMUSCULAR; INTRAVENOUS; SUBCUTANEOUS at 12:47

## 2020-09-20 RX ADMIN — CEFTRIAXONE 100 MILLIGRAM(S): 500 INJECTION, POWDER, FOR SOLUTION INTRAMUSCULAR; INTRAVENOUS at 12:47

## 2020-09-20 RX ADMIN — SODIUM CHLORIDE 1000 MILLILITER(S): 9 INJECTION INTRAMUSCULAR; INTRAVENOUS; SUBCUTANEOUS at 11:17

## 2020-09-20 NOTE — ED PROVIDER NOTE - ATTENDING CONTRIBUTION TO CARE
20 year old with fever and myalgias.  will look for source of fever, uti vs pna. possible covid.  well appearing.

## 2020-09-20 NOTE — ED PROVIDER NOTE - PROGRESS NOTE DETAILS
Patient feels better after IV fluids. explained that her sxs could be due to UTI or possible viral syndrome. pt explained covid precautions when going home until results received

## 2020-09-20 NOTE — ED PROVIDER NOTE - NSFOLLOWUPINSTRUCTIONS_ED_ALL_ED_FT
-READ ACCOMPANIED UTI INSTRUCTIONS FOR FURTHER DETAILS      Follow up with your doctor in 1 week - bring copies of your results if you were given. If you do not have a primary doctor, please call [phone] to find one convenient for you    - Return to the ED for any new, worsening, or concerning symptoms to you    - Continue all prescribed medications    - Take ibuprofen/tylenol as directed as needed for pain    - Rest and keep yourself hydrated with fluids

## 2020-09-20 NOTE — ED PROVIDER NOTE - OBJECTIVE STATEMENT
21 y/o F p/w fever (tmax 102), vomiting and myalgias x 3 days. pt states she had 6 episodes of vomiting, nbnb which has subsided yesterday after taking Zofran. pt has been self medicating with Tylenol earlier today. 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     pt denies dizziness, cp, palpitations, cough, sob, wheezing, abdominal pain, diarrhea, dysuria, hematuria, or any weight loss.

## 2020-09-20 NOTE — ED PROVIDER NOTE - PATIENT PORTAL LINK FT
You can access the FollowMyHealth Patient Portal offered by [facility] by registering at the following website: [email or web address]. By joining [facility]’s FollowMyHealth portal, you will also be able to view your health information using other applications (apps) compatible with our system.

## 2020-09-20 NOTE — ED PROVIDER NOTE - NS ED ROS FT
Constitutional: +fever   Eyes:  No visual changes  ENMT:  No neck pain  Cardiac:  No chest pain  Respiratory:  No cough, SOB  GI:  + vomiting   :  No dysuria, hematuria  MS:  No back pain.  Neuro:  No headache or lightheadedness  Skin:  No skin rash  Endocrine: No history of thyroid disease or diabetes.  Except as documented in the HPI,  all other systems are negative.

## 2020-09-20 NOTE — ED ADULT NURSE NOTE - NSIMPLEMENTINTERV_GEN_ALL_ED
Implemented All Universal Safety Interventions:  Neal to call system. Call bell, personal items and telephone within reach. Instruct patient to call for assistance. Room bathroom lighting operational. Non-slip footwear when patient is off stretcher. Physically safe environment: no spills, clutter or unnecessary equipment. Stretcher in lowest position, wheels locked, appropriate side rails in place.

## 2020-09-20 NOTE — ED ADULT NURSE NOTE - OBJECTIVE STATEMENT
Pt. A&Ox3 presents to ER c/o fever and abdominal pain for past 4 days. Pt. states highest temp at home was 102. Pt. states she has been experiencing N/V, chills, and intermittent headache. Pt. 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 Pt. respirations even and unlabored, abdomen nontender and nondistended, skin warm to touch and intact. 20G IV placed in LAC, flushing without resistance, dressing dry and intact.

## 2020-09-20 NOTE — ED PROVIDER NOTE - CLINICAL SUMMARY MEDICAL DECISION MAKING FREE TEXT BOX
21 y/o F p/w fever (tmax 102), vomiting and myalgias x 3 days. vitals wnl, no acute findings on exam. likely virals syndrome vs gastroenteritis. likely dispo with home with therapeutics

## 2020-09-20 NOTE — ED PROVIDER NOTE - PHYSICAL EXAMINATION
Vital signs reviewed  GENERAL: Patient nontoxic appearing, NAD  HEAD: NCAT  EYES: Anicteric  ENT: MMM  NECK: Supple, non tender  RESPIRATORY: Normal respiratory effort. CTA B/L. No wheezing, rales, rhonchi  CARDIOVASCULAR: Regular rate and rhythm  ABDOMEN: Soft. Nondistended. Nontender. No guarding or rebound. No CVA tenderness.  MUSCULOSKELETAL/EXTREMITIES: Brisk cap refill. 2+ radial pulses. No leg edema.  SKIN:  Warm and dry  NEURO: AAOx3. No gross FND.  PSYCHIATRIC: Cooperative. Affect appropriate.

## 2020-09-21 LAB
CULTURE RESULTS: SIGNIFICANT CHANGE UP
SPECIMEN SOURCE: SIGNIFICANT CHANGE UP

## 2020-12-21 PROBLEM — Z11.1 ENCOUNTER FOR PPD TEST: Status: RESOLVED | Noted: 2018-09-11 | Resolved: 2020-12-21

## 2023-01-03 NOTE — ED ADULT TRIAGE NOTE - ESI TRIAGE ACUITY LEVEL, MLM
Airway  Performed by: Avinash Graham MD  Authorized by: Avinash Graham MD     Final Airway Type:  Endotracheal airway  Final Endotracheal Airway*:  ETT  ETT Size (mm)*:  7.5  Cuff*:  Regular  Technique Used for Successful ETT Placement:  Direct laryngoscopy  Devices/Methods Used in Placement*:  Mask and Oral Airway  Intubation Procedure*:  ETCO2, Preoxygenation, Atraumatic, Dentition Unchanged and Phaynx Clear  Blade Type*:  MAC  Blade Size*:  3  Measured from*:  Teeth  Secured at (cm)*:  21  Placement Verified by: auscultation, capnometry and equal breath sounds    Glottic View*:  2 - partial view of glottis  Attempts*:  1  Ventilation Between Attempts:  Bag valve  Number of Other Approaches Attempted:  0   Patient Identified, Procedure confirmed, Emergency equipment available and Safety protocols followed  Location:  OR  Urgency:  Elective  Difficult Airway: No    Indications for Airway Management:  Anesthesia and airway protection  Sedation Level:  Anesthetized  Mask Difficulty Assessment:  2 - vent by mask + OA or adjuvant +/- NMBA  Performed By:  Anesthesiologist  Anesthesiologist:  Avinash Graham MD  Start Time: 1/3/2023 12:57 PM     3